# Patient Record
Sex: FEMALE | Race: BLACK OR AFRICAN AMERICAN | NOT HISPANIC OR LATINO | URBAN - METROPOLITAN AREA
[De-identification: names, ages, dates, MRNs, and addresses within clinical notes are randomized per-mention and may not be internally consistent; named-entity substitution may affect disease eponyms.]

---

## 2018-02-15 ENCOUNTER — OFFICE VISIT - HEALTHEAST (OUTPATIENT)
Dept: FAMILY MEDICINE | Facility: CLINIC | Age: 18
End: 2018-02-15

## 2018-02-15 DIAGNOSIS — S66.211A STRAIN OF EXTENSOR MUSCLE, FASCIA AND TENDON OF RIGHT THUMB AT WRIST AND HAND LEVEL, INITIAL ENCOUNTER: ICD-10-CM

## 2018-02-15 ASSESSMENT — MIFFLIN-ST. JEOR: SCORE: 1523.49

## 2018-04-16 ENCOUNTER — OFFICE VISIT - HEALTHEAST (OUTPATIENT)
Dept: FAMILY MEDICINE | Facility: CLINIC | Age: 18
End: 2018-04-16

## 2018-04-16 DIAGNOSIS — J02.9 PHARYNGITIS: ICD-10-CM

## 2018-04-16 LAB
DEPRECATED S PYO AG THROAT QL EIA: NORMAL
FLUAV AG SPEC QL IA: NORMAL
FLUBV AG SPEC QL IA: NORMAL

## 2018-04-17 LAB — GROUP A STREP BY PCR: NORMAL

## 2021-06-01 VITALS — HEIGHT: 67 IN | BODY MASS INDEX: 25.43 KG/M2 | WEIGHT: 162 LBS

## 2021-06-01 VITALS — WEIGHT: 160.5 LBS | BODY MASS INDEX: 25.33 KG/M2

## 2021-06-16 NOTE — PROGRESS NOTES
Assessment and Plan    1. Strain of extensor muscle, fascia and tendon of right thumb at wrist and hand level, initial encounter  - Ambulatory referral to Orthopedics placed per her request, however I noted that pain is a normal part of strain and that our body takes care of itself.  Given a wrist splint with thumb spica to minimize used of strained thumb - I recommended giving this some time before requesting an appointment with Orthopedic Surgery.  She interestingly notes that primary care is not a norm in [place] - everyone sees specialists (thus her concern that she see a specialist and/or have an MRI). Introduced concept of primary care.  Also reviewed NORMALLY it takes several week for body to heal from strain/ sprain and that pain is expected that will gradually go away    She was worried that she had re-injured an area of previous ligament injury - I used d online image of hand ligaments (not where her pain is ) vs tendons      Emi Ann MD     -------------------------------------------    Chief Complaint   Patient presents with     Finger Injury     right thumb, past injury with ligament, was seeing PT but didn't finish PT, was playing volleyball on Tuesday and setting the ball, was swollen and painful, swelling went down a little.  Might have to do a MRI.      Jovani is new here at [facility] and [facility].  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     She has  been playing volleyball for 12 years - quit for the last year and a half - but she has more recently be doing intramural volleyball.    Had a previous injury right thumb a year ago - had a partial tear of the ligament in her wrist - it would hurt when she would write a lot.  2 days ago she was playing volleyball, set up a hit and hyper-extended her right thumb on the ball.  She has had pain on lateral side of her first MCP since then.  She has not been using ice or NSAIDs    I reviewed the  following portions of the patient's history: allergies, current medications, past family history, past medical history, past social history, past surgical history and problem list.    There is no problem list on file for this patient.      No current outpatient prescriptions on file prior to visit.     No current facility-administered medications on file prior to visit.          History   Smoking Status     Never Smoker   Smokeless Tobacco     Never Used       History   Alcohol Use No       Vitals:    02/15/18 1138   BP: 118/74   Pulse: 95   SpO2: 98%     Body mass index is 25.56 kg/(m^2).     EXAM:    General appearance - alert, well appearing, and in no distress  Mental status - normal mood, behavior, speech, dress, motor activity, and thought processes  Musculoskeletal -  No pain to palpation at basal thumb join, no pain with passive ROM basal thumb joint, MCP or PIP.  Pain with resisted abduction and pronation.  Pain to palpation in lateral part of thenar eminence.  No swelling or bruising

## 2021-06-17 NOTE — PROGRESS NOTES
Assessment & Plan   1. Pharyngitis:  Negative flu and rapid strep though clinically I have concern for strep and discussed possibility of false negative with her given her recent antibiotic use.  Will treat with course of amoxicillin, discontinue Augmentin.  Counseled on importance of evaluation before taking antibiotics.   - Rapid Strep A Screen-Throat  - Influenza A/B Rapid Test  - amoxicillin (AMOXIL) 500 MG tablet; Take 1 tablet (500 mg total) by mouth 2 (two) times a day for 10 days. May substitute capsules  Dispense: 20 tablet; Refill: 0    Brenna Maya CNP      Subjective   Chief Complaint:  Sore Throat (x 2 days); Fever; and Cough (non-productive, pain when coughing)    HPI:   Jovani Quintanilla is a 17 y.o. female who presents for sore throat, fever and cough.      She states symptoms began two days ago.  Tactile fever, chills, body aches, sore throat and cough.  Tender nodes in neck.  Cough is not productive, chest hurts with coughing.  She is unaware of any specific exposures.  No history of underlying respiratory issues.  She notes she did start a course of Augmentin that she purchased in [place] yesterday and has so far taken two doses.      Allergies:  is allergic to sulfa (sulfonamide antibiotics).    Review of Systems:  A complete head to toe ROS is negative unless otherwise noted in HPI    Objective     Vitals:    04/16/18 1413   BP: 114/64   Patient Site: Right Arm   Patient Position: Sitting   Cuff Size: Adult Regular   Pulse: 88   Temp: 99.1  F (37.3  C)   TempSrc: Oral   Weight: 160 lb 8 oz (72.8 kg)       Physical Exam:  GENERAL: Alert, appears fatigued.    EYES: Conjunctiva pink, sclera white, no exudates  EARS: TMs pearly grey, no bulging, redness, retraction. Hearing grossly normal.   NOSE: Nares patent, no discharge.    MOUTH: Pharynx erythematous.  Tonsils 2+ bilaterally with white exudates.   NECK: Anterior cervical lymphadenopathy  CV: Regular rate and rhythm without murmurs, rubs or  gallops.  RESP: Lung sounds clear

## 2022-01-27 ENCOUNTER — APPOINTMENT (OUTPATIENT)
Dept: URBAN - METROPOLITAN AREA CLINIC 256 | Age: 22
Setting detail: DERMATOLOGY
End: 2022-01-27

## 2022-01-27 VITALS — RESPIRATION RATE: 15 BRPM | WEIGHT: 134.48 LBS | HEIGHT: 68 IN

## 2022-01-27 DIAGNOSIS — L65.8 OTHER SPECIFIED NONSCARRING HAIR LOSS: ICD-10-CM

## 2022-01-27 DIAGNOSIS — L64.8 OTHER ANDROGENIC ALOPECIA: ICD-10-CM

## 2022-01-27 PROCEDURE — 99202 OFFICE O/P NEW SF 15 MIN: CPT

## 2022-01-27 PROCEDURE — OTHER MIPS QUALITY: OTHER

## 2022-01-27 PROCEDURE — OTHER ADDITIONAL NOTES: OTHER

## 2022-01-27 PROCEDURE — OTHER COUNSELING: OTHER

## 2022-01-27 ASSESSMENT — LOCATION ZONE DERM
LOCATION ZONE: SCALP
LOCATION ZONE: FACE

## 2022-01-27 ASSESSMENT — LOCATION SIMPLE DESCRIPTION DERM
LOCATION SIMPLE: RIGHT SCALP
LOCATION SIMPLE: SUPERIOR FOREHEAD

## 2022-01-27 ASSESSMENT — LOCATION DETAILED DESCRIPTION DERM
LOCATION DETAILED: SUPERIOR MID FOREHEAD
LOCATION DETAILED: RIGHT MEDIAL FRONTAL SCALP

## 2022-01-27 NOTE — HPI: HAIR LOSS
Additional History: Patient notes her she has been losing her hair for the past 4 years. She has been seen for this before in [place]. She has been utilizing Anagen, Nanogen, NHP, and Solode. She denies any relief from these. She washes her hair once a week with monat. She notes her grandfathers have hair loss and notes her parents both have thin hair. She reports an itchy scalp that is associated with this. She has not used anything for this before. She has had blood work done for this but does not think it showed anything. She also noted a bump on the back of her head recently.

## 2022-01-27 NOTE — PROCEDURE: ADDITIONAL NOTES
Additional Notes: Patient asked about a hair transplant and if she would be a good candidate for this. Discussed this with her as well as PRP. Discussed why she is not a candidate for Finasteride.
Detail Level: Zone
Render Risk Assessment In Note?: no